# Patient Record
Sex: MALE | Race: ASIAN | URBAN - METROPOLITAN AREA
[De-identification: names, ages, dates, MRNs, and addresses within clinical notes are randomized per-mention and may not be internally consistent; named-entity substitution may affect disease eponyms.]

---

## 2017-07-12 ENCOUNTER — COMMUNICATION - HEALTHEAST (OUTPATIENT)
Dept: SCHEDULING | Facility: CLINIC | Age: 32
End: 2017-07-12

## 2017-07-13 ENCOUNTER — OFFICE VISIT - HEALTHEAST (OUTPATIENT)
Dept: FAMILY MEDICINE | Facility: CLINIC | Age: 32
End: 2017-07-13

## 2017-07-13 ENCOUNTER — HOSPITAL ENCOUNTER (OUTPATIENT)
Dept: CT IMAGING | Facility: HOSPITAL | Age: 32
Discharge: HOME | End: 2017-07-13
Attending: FAMILY MEDICINE

## 2017-07-13 DIAGNOSIS — M79.89 RIGHT AXILLARY SWELLING: ICD-10-CM

## 2017-07-13 DIAGNOSIS — L03.90 CELLULITIS: ICD-10-CM

## 2021-05-31 VITALS — BODY MASS INDEX: 31.35 KG/M2 | WEIGHT: 188.4 LBS

## 2021-06-11 NOTE — PROGRESS NOTES
Subjective:      Patient ID: Jesse Martinez is a 31 y.o. male.    Chief Complaint:    HPI  Patient comes in for evaluation of right axillary pain for the last 5 days.  Pain seems like it is getting worse.  Pain feels like an aching and sometimes burning sensation.  He feels some radiation towards the right pectoralis region.  He denies any injury to the area.  He finds this difficult to completely abduct his arm to his side because of the discomfort.  This is not happened to him before.      He thought this might have been caused by a new deodorant that he was using, but there is no problems on the left side. He has had no fevers or chills.  He has taken some ibuprofen with just mild relief.    Past Medical History:   Diagnosis Date     Chronic mastoiditis     right ear pain currently     GERD (gastroesophageal reflux disease)      PONV (postoperative nausea and vomiting)        Past Surgical History:   Procedure Laterality Date     first dorsal compartment release Right     for de Quervains tenosynovitis     INNER EAR SURGERY      x2     MASTOIDECTOMY Right 10/9/2014    Procedure: REVISION RIGHT CANAL WALL DOWN MASTOIDECTOMY;  Surgeon: Nael Albrecht MD;  Location: [facility];  Service:      NH APPENDECTOMY      Description: Appendectomy;  Recorded: 07/23/2013;       No family history on file.    Social History   Substance Use Topics     Smoking status: Current Some Day Smoker     Smokeless tobacco: None     Alcohol use Yes      Comment: occasional       Review of Systems    Objective:     /70  Pulse 71  Temp 97.7  F (36.5  C) (Oral)   Resp 20  Wt 188 lb 6.4 oz (85.5 kg)  SpO2 100%  BMI 31.35 kg/m2    Physical Exam   Constitutional:   He looks uncomfortable when moving his right arm.   Cardiovascular: Normal rate.    Pulmonary/Chest: Effort normal. No respiratory distress.   Musculoskeletal:   Right axilla: There is multiple red papules with some scabbing and 1 area that appears like an  abscess that is red and measures 2 cm x 1 cm.  The entire area is tender and he has a difficult time abducting his arm.  I am unable to appreciate any lymphadenopathy secondary to the pain and mild swelling.   Neurological: He is alert.   Skin: Skin is warm and dry.       CT of the right shoulder with contrast: I reviewed the images and did not see anything obvious.  Official radiology report as below.    Ct Shoulder With Contrast Right    Result Date: 7/13/2017  [facility] CT RIGHT SHOULDER WITH IV CONTRAST INDICATION: Right axillary cellulitis. Concern for deep space infection. TECHNIQUE: Axial CT examination of the right shoulder obtained following uneventful intravenous administration of 100 mL Omnipaque 350 contrast. Sagittal and coronal thin-section reconstruction. Dose reduction techniques were used. COMPARISON: None. FINDINGS: Mild skin thickening and infiltration of the subcutaneous fat in the medial right proximal arm/right axilla. No deep abscess. No axillary adenopathy. Anatomic alignment of the right shoulder. No acute shoulder fracture. Normal acromioclavicular and glenohumeral joints. Small bone islands in the proximal humerus. Right axillary vessels unremarkable. Visualized right hemithorax and right upper quadrant unremarkable.     CONCLUSION: 1.  Mild skin thickening and infiltration of the subcutaneous fat in the medial right proximal arm/right axilla. Findings consistent with mild cellulitis. No deep abscess or right axillary lymphadenopathy.           Assessment and Plan:     Procedures    The primary encounter diagnosis was Right axillary swelling. A diagnosis of Cellulitis was also pertinent to this visit.     Right axillary infection with a possible early abscess and other smaller infections.  Concern for cellulitis.  Will treat with cephalexin in addition to Septra to cover typical strep and staph as well as MRSA.      Patient Instructions     Skin infection of the right axilla.  No  evidence of abscess.    I will treat you with 2 antibiotics.    Nothing on the skin until this is healed    Follow-up in the next 5 days if not improving and sooner if worse.

## 2021-07-05 ENCOUNTER — RECORDS - HEALTHEAST (OUTPATIENT)
Dept: ADMINISTRATIVE | Facility: OTHER | Age: 36
End: 2021-07-05

## 2021-07-05 ENCOUNTER — HOSPITAL ENCOUNTER (EMERGENCY)
Dept: EMERGENCY MEDICINE | Facility: HOSPITAL | Age: 36
Discharge: HOME | End: 2021-07-05
Attending: STUDENT IN AN ORGANIZED HEALTH CARE EDUCATION/TRAINING PROGRAM

## 2021-07-05 ENCOUNTER — RECORDS - HEALTHEAST (OUTPATIENT)
Dept: EMERGENCY MEDICINE | Facility: HOSPITAL | Age: 36
End: 2021-07-05

## 2021-07-05 DIAGNOSIS — K52.9 GASTROENTERITIS: ICD-10-CM

## 2021-07-05 LAB
ALBUMIN SERPL-MCNC: 4 G/DL (ref 3.5–5)
ALP SERPL-CCNC: 98 U/L (ref 45–120)
ALT SERPL W P-5'-P-CCNC: 162 U/L (ref 0–45)
ANION GAP SERPL CALCULATED.3IONS-SCNC: 10 MMOL/L (ref 5–18)
AST SERPL W P-5'-P-CCNC: 96 U/L (ref 0–40)
BASOPHILS # BLD AUTO: 0 THOU/UL (ref 0–0.2)
BASOPHILS NFR BLD AUTO: 0 % (ref 0–2)
BILIRUB SERPL-MCNC: 2 MG/DL (ref 0–1)
BUN SERPL-MCNC: 15 MG/DL (ref 8–22)
CALCIUM SERPL-MCNC: 8.6 MG/DL (ref 8.5–10.5)
CHLORIDE BLD-SCNC: 106 MMOL/L (ref 98–107)
CO2 SERPL-SCNC: 24 MMOL/L (ref 22–31)
CREAT SERPL-MCNC: 0.9 MG/DL (ref 0.7–1.3)
EOSINOPHIL # BLD AUTO: 0.1 THOU/UL (ref 0–0.4)
EOSINOPHIL NFR BLD AUTO: 1 % (ref 0–6)
ERYTHROCYTE [DISTWIDTH] IN BLOOD BY AUTOMATED COUNT: 12.3 % (ref 11–14.5)
GFR SERPL CREATININE-BSD FRML MDRD: >60 ML/MIN/1.73M2
GLUCOSE BLD-MCNC: 116 MG/DL (ref 70–125)
HCT VFR BLD AUTO: 45.1 % (ref 40–54)
HGB BLD-MCNC: 15 G/DL (ref 14–18)
IMM GRANULOCYTES # BLD: 0 THOU/UL
IMM GRANULOCYTES NFR BLD: 1 %
LACTATE SERPL-SCNC: 1.3 MMOL/L (ref 0.7–2)
LYMPHOCYTES # BLD AUTO: 0.9 THOU/UL (ref 0.8–4.4)
LYMPHOCYTES NFR BLD AUTO: 12 % (ref 20–40)
MCH RBC QN AUTO: 29.6 PG (ref 27–34)
MCHC RBC AUTO-ENTMCNC: 33.3 G/DL (ref 32–36)
MCV RBC AUTO: 89 FL (ref 80–100)
MONOCYTES # BLD AUTO: 0.5 THOU/UL (ref 0–0.9)
MONOCYTES NFR BLD AUTO: 7 % (ref 2–10)
NEUTROPHILS # BLD AUTO: 6.1 THOU/UL (ref 2–7.7)
NEUTROPHILS NFR BLD AUTO: 80 % (ref 50–70)
PLATELET # BLD AUTO: 242 THOU/UL (ref 140–440)
PMV BLD AUTO: 9.4 FL (ref 8.5–12.5)
POTASSIUM BLD-SCNC: 3.6 MMOL/L (ref 3.5–5)
PROT SERPL-MCNC: 7.6 G/DL (ref 6–8)
RBC # BLD AUTO: 5.07 MILL/UL (ref 4.4–6.2)
SODIUM SERPL-SCNC: 140 MMOL/L (ref 136–145)
WBC: 7.7 THOU/UL (ref 4–11)

## 2021-07-05 NOTE — ED PROVIDER NOTES
ED Provider Notes by Yary Nicole MD at 2021  2:46 PM     Author: Yary Nicole MD Service: -- Author Type: Physician    Filed: 2021  5:25 PM Date of Service: 2021  2:46 PM Status: Signed    : Yary Nicole MD (Physician)       EMERGENCY DEPARTMENT ENCOUNTER      NAME: Jesse Martinez  AGE: 35 y.o. male  YOB: 1985  MRN: [id number]  EVALUATION DATE & TIME: 2021  2:43 PM    PCP: Provider, No Primary Care    ED PROVIDER: Yary Nicole M.D.    Chief Complaint   Patient presents with   ? Dizziness   ? Nausea/Vomiting/Diarrhea       FINAL IMPRESSION:  1. Gastroenteritis        ED COURSE & MEDICAL DECISION MAKIN y.o. old male who presents to the ED for evaluation of diarrhea, generalized weakness, nausea, body aches.   History and physical examination as documented. Vital signs reassuring.     Patient has a nontender abdomen.  He is describing infectious symptoms with associated diarrhea.  Started yesterday after he was in a large crowd of people at [facility].  Labs are reassuring.  No significant leukocytosis.  No indication for CT imaging as he is nontender.  No major electrolyte or metabolic derangement.  He was quite dry on exam.  Mildly tachycardic when he checked in.  He was given IV fluids, Toradol, Compazine.  Felt better with this.  I think his presentation is most likely consistent with a gastroenteritis.  He does have some mild elevation of his LFTs which I suspect is secondary to viral infection.  No obstructive pattern and no right upper quadrant tenderness.     Patient was educated on return precautions including localization of pain, fevers, persistent vomiting, failure to improve.  Advised that he follow-up with his primary care physician as needed.  He was discharged in stable condition.     Scribe time stamps  3:06 PM Performed my initial history and physical exam, discussed ED course and treatment plan.  4:44 PM I rechecked with patient and updated with results. Plans  "for discharge. Patient is agreeable with plan.     0 minutes of critical care time     MEDICATIONS GIVEN IN THE EMERGENCY:  Medications   sodium chloride flush 10 mL (NS) (has no administration in time range)   sodium chloride 0.9% 1,000 mL (0 mL Intravenous Stopped 7/5/21 1650)   ketorolac injection 15 mg (TORADOL) (15 mg Intravenous Given 7/5/21 1551)   prochlorperazine injection 10 mg (COMPAZINE) (10 mg Intravenous Given 7/5/21 1552)       NEW PRESCRIPTIONS STARTED AT TODAY'S ER VISIT  There are no discharge medications for this patient.       =================================================================    HPI    Patient information was obtained from: patient    Use of : N/A     Jesse Her is a 35 y.o. male with no significant pertinent history who presents to this ED via walk in for evaluation of dizziness, nausea, vomiting, diarrhea.    Patient reports he was at [facility] on 7/4 when he developed weakness \"all over\", abdominal pain, pressure on his head, dizziness, lightheadness, diarrhea and fever. Earlier when he measured his fever, it was the high 100s. His wife did apply a wet cloth to help cleanse him and it did bring down his fever to 98.7 but gradually the fever increased again. Has been breathing much heavier. He been developing diarrhea \"a lot all day yesterday and today\". Whenever he eats, he feels the need to use the bathroom. Notes that he has a gastrologist appointment this weekend. Unsure if he is experiencing dysuria, but says he \"feels the heat\". Has no similar previous experience. Denies chest pain, leg pain/swelling, vomiting. No blood thinners. No antibiotics. Is fully vaccinated for COVID-19. No known sick contacts. No other complaints at this time.     REVIEW OF SYSTEMS   Review of Systems   Constitutional: Positive for fever (resolved at present).   Cardiovascular: Negative for chest pain and leg swelling.   Gastrointestinal: Positive for abdominal pain, diarrhea, nausea and " "vomiting.   Neurological: Positive for dizziness, weakness, light-headedness and headaches (\"pressure\").   All other systems reviewed and are negative.       PAST MEDICAL HISTORY:  History reviewed. No pertinent past medical history.    PAST SURGICAL HISTORY:  History reviewed. No pertinent surgical history.    CURRENT MEDICATIONS:    No current facility-administered medications on file prior to encounter.      No current outpatient medications on file prior to encounter.       ALLERGIES:  No Known Allergies    FAMILY HISTORY:  History reviewed. No pertinent family history.    SOCIAL HISTORY:   Social History     Socioeconomic History   ? Marital status:      Spouse name: None   ? Number of children: None   ? Years of education: None   ? Highest education level: None   Occupational History   ? None   Social Needs   ? Financial resource strain: None   ? Food insecurity     Worry: None     Inability: None   ? Transportation needs     Medical: None     Non-medical: None   Tobacco Use   ? Smoking status: None   Substance and Sexual Activity   ? Alcohol use: None   ? Drug use: None   ? Sexual activity: None   Lifestyle   ? Physical activity     Days per week: None     Minutes per session: None   ? Stress: None   Relationships   ? Social connections     Talks on phone: None     Gets together: None     Attends Faith service: None     Active member of club or organization: None     Attends meetings of clubs or organizations: None     Relationship status: None   ? Intimate partner violence     Fear of current or ex partner: None     Emotionally abused: None     Physically abused: None     Forced sexual activity: None   Other Topics Concern   ? None   Social History Narrative   ? None       VITALS:  Patient Vitals for the past 24 hrs:   BP Temp Temp src Pulse Resp SpO2 Height Weight   07/05/21 1630 117/70 98.6  F (37  C) Oral 84 -- 96 % -- --   07/05/21 1615 117/73 -- -- 80 -- 97 % -- --   07/05/21 1220 131/77 97.5 " " F (36.4  C) Temporal 98 16 98 % 5' 5\" (1.651 m) 200 lb (90.7 kg)       PHYSICAL EXAM    General: Uncomfortable.  HEENT: No external signs of head trauma. Oropharynx clear and moist.  Chest/Pulm: Lungs clear to auscultation bilaterally. No respiratory distress. Breathing comfortably on room air.   CV: Regular rate and rhythm without murmurs. Warm and well perfused.   Abdomen: Soft and non-distended without tenderness to palpation. No peritoneal signs.   MSK/Extremities: Actively moving all four extremities. No pedal edema.  Skin: No visible rashes. Not diaphoretic.   Neuro: Alert and conversant. GCS 15.  Psych: Behavior normal.    LAB:  All pertinent labs reviewed and interpreted.  Results for orders placed or performed during the hospital encounter of 07/05/21   Lactic Acid   Result Value Ref Range    Lactic Acid 1.3 0.7 - 2.0 mmol/L   Comprehensive Metabolic Panel   Result Value Ref Range    Sodium 140 136 - 145 mmol/L    Potassium 3.6 3.5 - 5.0 mmol/L    Chloride 106 98 - 107 mmol/L    CO2 24 22 - 31 mmol/L    Anion Gap, Calculation 10 5 - 18 mmol/L    Glucose 116 70 - 125 mg/dL    BUN 15 8 - 22 mg/dL    Creatinine 0.90 0.70 - 1.30 mg/dL    GFR MDRD Af Amer >60 >60 mL/min/1.73m2    GFR MDRD Non Af Amer >60 >60 mL/min/1.73m2    Bilirubin, Total 2.0 (H) 0.0 - 1.0 mg/dL    Calcium 8.6 8.5 - 10.5 mg/dL    Protein, Total 7.6 6.0 - 8.0 g/dL    Albumin 4.0 3.5 - 5.0 g/dL    Alkaline Phosphatase 98 45 - 120 U/L    AST 96 (H) 0 - 40 U/L     (H) 0 - 45 U/L   HM1 (CBC with Diff)   Result Value Ref Range    WBC 7.7 4.0 - 11.0 thou/uL    RBC 5.07 4.40 - 6.20 mill/uL    Hemoglobin 15.0 14.0 - 18.0 g/dL    Hematocrit 45.1 40.0 - 54.0 %    MCV 89 80 - 100 fL    MCH 29.6 27.0 - 34.0 pg    MCHC 33.3 32.0 - 36.0 g/dL    RDW 12.3 11.0 - 14.5 %    Platelets 242 140 - 440 thou/uL    MPV 9.4 8.5 - 12.5 fL    Neutrophils % 80 (H) 50 - 70 %    Lymphocytes % 12 (L) 20 - 40 %    Monocytes % 7 2 - 10 %    Eosinophils % 1 0 - 6 %    " Basophils % 0 0 - 2 %    Immature Granulocyte % 1 (H) <=0 %    Neutrophils Absolute 6.1 2.0 - 7.7 thou/uL    Lymphocytes Absolute 0.9 0.8 - 4.4 thou/uL    Monocytes Absolute 0.5 0.0 - 0.9 thou/uL    Eosinophils Absolute 0.1 0.0 - 0.4 thou/uL    Basophils Absolute 0.0 0.0 - 0.2 thou/uL    Immature Granulocyte Absolute 0.0 <=0.0 thou/uL     I, Marija Janet, am serving as a scribe to document services personally performed by Dr. Nicole based on my observation and the provider's statements to me. I, Yary Nicole MD attest that Marija Gonzales is acting in a scribe capacity, has observed my performance of the services and has documented them in accordance with my direction.    Yary Nicole M.D.  Emergency Medicine  [facility] EMERGENCY DEPARTMENT  [address]  [facility] [address]  Dept: [phone]  Loc: [phone]     Yary Nicole MD  07/05/21 3170

## 2021-07-05 NOTE — ED NOTES
ED Notes by Chelo Pinto RN at 7/5/2021  3:40 PM     Author: Chelo Pinto RN Service: -- Author Type: Registered Nurse    Filed: 7/5/2021  4:18 PM Date of Service: 7/5/2021  3:40 PM Status: Signed    : Chelo Pinto RN (Registered Nurse)       Visitor at bedside requesting infiltrated IV be discontinued. PIV removed with catheter intact and dressing applied. 2nd PIV placed and flushed without any signs of infiltration.

## 2021-07-05 NOTE — ED TRIAGE NOTES
ED Triage Notes by Yazmin Hylton RN at 7/5/2021 12:19 PM     Author: Yazmin Hylton RN Service: -- Author Type: Registered Nurse    Filed: 7/5/2021 12:20 PM Date of Service: 7/5/2021 12:19 PM Status: Signed    : Yazmin Hylton RN (Registered Nurse)       Pt complains of dizziness, weakness, nausea. Has not vomited. Began yesterday. Able to tolerate small amounts of liquids.

## 2021-07-06 VITALS — BODY MASS INDEX: 33.28 KG/M2 | WEIGHT: 200 LBS | BODY MASS INDEX: 33.28 KG/M2 | HEIGHT: 65 IN

## 2021-08-21 ENCOUNTER — HEALTH MAINTENANCE LETTER (OUTPATIENT)
Age: 36
End: 2021-08-21

## 2021-11-10 DIAGNOSIS — Z53.9 DIAGNOSIS NOT YET DEFINED: Primary | ICD-10-CM

## 2021-11-14 ENCOUNTER — HEALTH MAINTENANCE LETTER (OUTPATIENT)
Age: 36
End: 2021-11-14

## 2022-11-21 ENCOUNTER — HEALTH MAINTENANCE LETTER (OUTPATIENT)
Age: 37
End: 2022-11-21

## 2023-11-26 ENCOUNTER — HEALTH MAINTENANCE LETTER (OUTPATIENT)
Age: 38
End: 2023-11-26